# Patient Record
Sex: MALE | Race: WHITE
[De-identification: names, ages, dates, MRNs, and addresses within clinical notes are randomized per-mention and may not be internally consistent; named-entity substitution may affect disease eponyms.]

---

## 2021-06-09 ENCOUNTER — HOSPITAL ENCOUNTER (EMERGENCY)
Dept: HOSPITAL 96 - M.ERS | Age: 76
Discharge: HOME | End: 2021-06-09
Payer: MEDICARE

## 2021-06-09 VITALS — DIASTOLIC BLOOD PRESSURE: 73 MMHG | SYSTOLIC BLOOD PRESSURE: 129 MMHG

## 2021-06-09 VITALS — HEIGHT: 70 IN | WEIGHT: 210.01 LBS | BODY MASS INDEX: 30.07 KG/M2

## 2021-06-09 DIAGNOSIS — Z88.0: ICD-10-CM

## 2021-06-09 DIAGNOSIS — T67.1XXA: Primary | ICD-10-CM
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
## 2021-06-09 DIAGNOSIS — R41.82: ICD-10-CM

## 2021-06-09 DIAGNOSIS — R42: ICD-10-CM

## 2021-06-09 DIAGNOSIS — Z79.899: ICD-10-CM

## 2021-06-09 DIAGNOSIS — I10: ICD-10-CM

## 2021-06-09 LAB
ABSOLUTE BASOPHILS: 0.1 THOU/UL (ref 0–0.2)
ABSOLUTE EOSINOPHILS: 0.2 THOU/UL (ref 0–0.7)
ABSOLUTE MONOCYTES: 0.6 THOU/UL (ref 0–1.2)
ALBUMIN SERPL-MCNC: 4.3 G/DL (ref 3.4–5)
ALP SERPL-CCNC: 48 U/L (ref 46–116)
ALT SERPL-CCNC: 13 U/L (ref 30–65)
ANION GAP SERPL CALC-SCNC: 6 MMOL/L (ref 7–16)
AST SERPL-CCNC: 14 U/L (ref 15–37)
BASOPHILS NFR BLD AUTO: 0.6 %
BILIRUB SERPL-MCNC: 1 MG/DL
BILIRUB UR-MCNC: NEGATIVE MG/DL
BUN SERPL-MCNC: 19 MG/DL (ref 7–18)
CALCIUM SERPL-MCNC: 9.3 MG/DL (ref 8.5–10.1)
CHLORIDE SERPL-SCNC: 103 MMOL/L (ref 98–107)
CO2 SERPL-SCNC: 30 MMOL/L (ref 21–32)
COLOR UR: YELLOW
CREAT SERPL-MCNC: 1.5 MG/DL (ref 0.6–1.3)
EOSINOPHIL NFR BLD: 2.1 %
GLUCOSE SERPL-MCNC: 99 MG/DL (ref 70–99)
GRANULOCYTES NFR BLD MANUAL: 70.6 %
HCT VFR BLD CALC: 41.3 % (ref 42–52)
HGB BLD-MCNC: 13.9 GM/DL (ref 14–18)
KETONES UR STRIP-MCNC: (no result) MG/DL
LYMPHOCYTES # BLD: 2.5 THOU/UL (ref 0.8–5.3)
LYMPHOCYTES NFR BLD AUTO: 21.5 %
MAGNESIUM SERPL-MCNC: 2 MG/DL (ref 1.8–2.4)
MCH RBC QN AUTO: 30.8 PG (ref 26–34)
MCHC RBC AUTO-ENTMCNC: 33.6 G/DL (ref 28–37)
MCV RBC: 91.6 FL (ref 80–100)
MONOCYTES NFR BLD: 5.2 %
MPV: 8 FL. (ref 7.2–11.1)
NEUTROPHILS # BLD: 8.2 THOU/UL (ref 1.6–8.1)
NUCLEATED RBCS: 0 /100WBC
PLATELET COUNT*: 275 THOU/UL (ref 150–400)
POTASSIUM SERPL-SCNC: 3.8 MMOL/L (ref 3.5–5.1)
PROT SERPL-MCNC: 8 G/DL (ref 6.4–8.2)
PROT UR QL STRIP: (no result)
RBC # BLD AUTO: 4.51 MIL/UL (ref 4.5–6)
RBC # UR STRIP: NEGATIVE /UL
RDW-CV: 13.6 % (ref 10.5–14.5)
SODIUM SERPL-SCNC: 139 MMOL/L (ref 136–145)
SP GR UR STRIP: 1.02 (ref 1–1.03)
URINE CLARITY: CLEAR
URINE GLUCOSE-RANDOM: NEGATIVE
URINE LEUKOCYTES-REFLEX: NEGATIVE
URINE NITRITE-REFLEX: NEGATIVE
UROBILINOGEN UR STRIP-ACNC: 0.2 E.U./DL (ref 0.2–1)
WBC # BLD AUTO: 11.6 THOU/UL (ref 4–11)

## 2021-06-09 NOTE — EKG
[place] [address]
Phone:  [phone]                     ELECTROCARDIOGRAM REPORT      
_______________________________________________________________________________
 
Name:         MERCED SANTIAGO               Room:                     REG ER 
M.R.#:    [id number]     Account #:     [id number]  
Admission:    21    Attend Phys:                     
Discharge:                Date of Birth: [birth year]  
Date of Service: 21 1333  Report #:      [id number]
        [id number]
_______________________________________________________________________________
THIS REPORT FOR:  //name//                      
 
                         [facility] ED
                                       
Test Date:    2021               Test Time:    13:33:04
Pat Name:     MERCED SANTIAGO            Department:   
Patient ID:   [id number]            Room:          
Gender:                               Technician:   
:          1945               Requested By: Filiberto Nails
Order Number: [id number] MD:   Gumaro Campbell
                                 Measurements
Intervals                              Axis          
Rate:         82                       P:            65
IN:           168                      QRS:          45
QRSD:         79                       T:            35
QT:           350                                    
QTc:          409                                    
                           Interpretive Statements
Sinus rhythm
Probable left atrial enlargement
Baseline wander in lead(s) V4
No previous ECG available for comparison
Electronically Signed On 2021 15:35:58 CDT by Gumaro Campbell
[email or web address]
 
 
 
 
 
 
 
 
 
 
 
 
 
 
 
 
 
 
 
 
  <ELECTRONICALLY SIGNED>
                                           By: Gumaro Campbell MD, [facility]     
  21     1535
D: 21   _____________________________________
T: 21   Gumaro Campbell MD, [facility]       /EPI

## 2021-11-27 ENCOUNTER — HOSPITAL ENCOUNTER (EMERGENCY)
Dept: HOSPITAL 96 - M.ERS | Age: 76
Discharge: HOME | End: 2021-11-27
Payer: MEDICARE

## 2021-11-27 VITALS — HEIGHT: 70 IN | BODY MASS INDEX: 29.35 KG/M2 | WEIGHT: 205.01 LBS

## 2021-11-27 VITALS — DIASTOLIC BLOOD PRESSURE: 61 MMHG | SYSTOLIC BLOOD PRESSURE: 124 MMHG

## 2021-11-27 DIAGNOSIS — R55: ICD-10-CM

## 2021-11-27 DIAGNOSIS — I10: ICD-10-CM

## 2021-11-27 DIAGNOSIS — Z88.0: ICD-10-CM

## 2021-11-27 DIAGNOSIS — B02.9: Primary | ICD-10-CM

## 2021-11-27 DIAGNOSIS — Z79.899: ICD-10-CM

## 2021-11-27 LAB
ABSOLUTE BASOPHILS: 0.1 THOU/UL (ref 0–0.2)
ABSOLUTE EOSINOPHILS: 0 THOU/UL (ref 0–0.7)
ABSOLUTE MONOCYTES: 0.5 THOU/UL (ref 0–1.2)
ALBUMIN SERPL-MCNC: 3.3 G/DL (ref 3.4–5)
ALP SERPL-CCNC: 40 U/L (ref 46–116)
ALT SERPL-CCNC: 27 U/L (ref 30–65)
ANION GAP SERPL CALC-SCNC: 8 MMOL/L (ref 7–16)
AST SERPL-CCNC: 23 U/L (ref 15–37)
BASOPHILS NFR BLD AUTO: 0.7 %
BILIRUB SERPL-MCNC: 1.1 MG/DL
BUN SERPL-MCNC: 19 MG/DL (ref 7–18)
CALCIUM SERPL-MCNC: 8.5 MG/DL (ref 8.5–10.1)
CHLORIDE SERPL-SCNC: 98 MMOL/L (ref 98–107)
CO2 SERPL-SCNC: 29 MMOL/L (ref 21–32)
CREAT SERPL-MCNC: 1.2 MG/DL (ref 0.6–1.3)
EOSINOPHIL NFR BLD: 0.4 %
GLUCOSE SERPL-MCNC: 115 MG/DL (ref 70–99)
GRANULOCYTES NFR BLD MANUAL: 84.2 %
HCT VFR BLD CALC: 42.7 % (ref 42–52)
HGB BLD-MCNC: 14.6 GM/DL (ref 14–18)
LYMPHOCYTES # BLD: 0.8 THOU/UL (ref 0.8–5.3)
LYMPHOCYTES NFR BLD AUTO: 8.8 %
MCH RBC QN AUTO: 30.6 PG (ref 26–34)
MCHC RBC AUTO-ENTMCNC: 34.1 G/DL (ref 28–37)
MCV RBC: 89.6 FL (ref 80–100)
MONOCYTES NFR BLD: 5.9 %
MPV: 8 FL. (ref 7.2–11.1)
NEUTROPHILS # BLD: 7.7 THOU/UL (ref 1.6–8.1)
NUCLEATED RBCS: 0 /100WBC
PLATELET COUNT*: 188 THOU/UL (ref 150–400)
POTASSIUM SERPL-SCNC: 4.6 MMOL/L (ref 3.5–5.1)
PROT SERPL-MCNC: 7.1 G/DL (ref 6.4–8.2)
RBC # BLD AUTO: 4.77 MIL/UL (ref 4.5–6)
RDW-CV: 13.2 % (ref 10.5–14.5)
SODIUM SERPL-SCNC: 135 MMOL/L (ref 136–145)
WBC # BLD AUTO: 9.2 THOU/UL (ref 4–11)

## 2021-11-27 NOTE — EKG
[place] [address]
Phone:  [phone]                     ELECTROCARDIOGRAM REPORT      
_______________________________________________________________________________
 
Name:         MERCED SANTIAGO               Room:                     [facility]#:    [id number]     Account #:     [id number]  
Admission:    21    Attend Phys:                     
Discharge:    21    Date of Birth: [birth year]  
Date of Service: 21 1142  Report #:      [id number]
        [id number]
_______________________________________________________________________________
THIS REPORT FOR:  //name//                      
 
                         [facility] ED
                                       
Test Date:    2021               Test Time:    11:42:41
Pat Name:     MERCED SANTIAGO            Department:   
Patient ID:   [id number]            Room:          
Gender:                               Technician:   
:          1945               Requested By: Errol Balbuena
Order Number: [id number] MD:   Ashkan Fernandes
                                 Measurements
Intervals                              Axis          
Rate:         92                       P:            81
NC:           151                      QRS:          70
QRSD:         80                       T:            72
QT:           337                                    
QTc:          417                                    
                           Interpretive Statements
Sinus rhythm
Atrial premature complex
Minimal ST depression, inferior leads
Baseline wander in lead(s) I,II,aVR
Compared to ECG 2021 13:33:04
Atrial premature complex(es) now present
ST (T wave) deviation now present
Electronically Signed On 2021 12:33:40 CST by Ashkan Fernandes
[email or web address]
 
 
 
 
 
 
 
 
 
 
 
 
 
 
 
 
 
  <ELECTRONICALLY SIGNED>
                                           By: Ashkan Fernandes MD, FACC      
  21     1233
D: 21 1142   _____________________________________
T: 21 1142   Ashkan Fernandes MD, FACC        /EPI